# Patient Record
Sex: MALE | URBAN - METROPOLITAN AREA
[De-identification: names, ages, dates, MRNs, and addresses within clinical notes are randomized per-mention and may not be internally consistent; named-entity substitution may affect disease eponyms.]

---

## 2022-07-18 ENCOUNTER — APPOINTMENT (RX ONLY)
Dept: URBAN - METROPOLITAN AREA CLINIC 34 | Facility: CLINIC | Age: 27
Setting detail: DERMATOLOGY
End: 2022-07-18

## 2022-07-18 DIAGNOSIS — D485 NEOPLASM OF UNCERTAIN BEHAVIOR OF SKIN: ICD-10-CM

## 2022-07-18 PROBLEM — D48.5 NEOPLASM OF UNCERTAIN BEHAVIOR OF SKIN: Status: ACTIVE | Noted: 2022-07-18

## 2022-07-18 PROCEDURE — ? COUNSELING

## 2022-07-18 PROCEDURE — 11102 TANGNTL BX SKIN SINGLE LES: CPT

## 2022-07-18 PROCEDURE — ? BIOPSY BY SHAVE METHOD

## 2022-07-18 PROCEDURE — ? ADDITIONAL NOTES

## 2022-07-18 ASSESSMENT — LOCATION SIMPLE DESCRIPTION DERM: LOCATION SIMPLE: RIGHT FOREARM

## 2022-07-18 ASSESSMENT — LOCATION DETAILED DESCRIPTION DERM: LOCATION DETAILED: RIGHT DISTAL DORSAL FOREARM

## 2022-07-18 ASSESSMENT — LOCATION ZONE DERM: LOCATION ZONE: ARM

## 2022-07-18 NOTE — HPI: WART (PATIENT REPORTED)
Where Is Your Wart Located?: R wrist
List Over The Counter Wart Treatments You Are Currently Using (Separate Each Name With A Comma):: Salicylic acid and freeze off

## 2022-07-18 NOTE — PROCEDURE: ADDITIONAL NOTES
Additional Notes: Pt notes lesion occurred after a cat scratch \\nPt was treating with OTC wart freeze off spray and salicylic acid \\nDiscussed biopsy today, pt agreeable
Detail Level: Simple
Render Risk Assessment In Note?: no